# Patient Record
Sex: MALE | Race: WHITE | Employment: OTHER | URBAN - METROPOLITAN AREA
[De-identification: names, ages, dates, MRNs, and addresses within clinical notes are randomized per-mention and may not be internally consistent; named-entity substitution may affect disease eponyms.]

---

## 2019-06-21 ENCOUNTER — ANESTHESIA EVENT (OUTPATIENT)
Dept: SURGERY | Age: 69
End: 2019-06-21
Payer: COMMERCIAL

## 2019-06-24 NOTE — PERIOP NOTES
PAT - SURGICAL PRE-ADMISSION INSTRUCTIONS    NAME:  Anselmo Ken                                                          TODAY'S DATE:  6/24/2019    SURGERY DATE:  6/26/2019                                  SURGERY ARRIVAL TIME:   0530    1. Do NOT eat or drink anything, including candy or gum, after MIDNIGHT on 06/25 , unless you have specific instructions from your Surgeon or Anesthesia Provider to do so. 2. No smoking on the day of surgery. 3. No alcohol 24 hours prior to the day of surgery. 4. No recreational drugs for one week prior to the day of surgery. 5. Leave all valuables, including money/purse, at home. 6. Remove all jewelry, nail polish, makeup (including mascara); no lotions, powders, deodorant, or perfume/cologne/after shave. 7. Glasses/Contact lenses and Dentures may be worn to the hospital.  They will be removed prior to surgery. 8. Call your doctor if symptoms of a cold or illness develop within 24 ours prior to surgery. 9. AN ADULT MUST DRIVE YOU HOME AFTER OUTPATIENT SURGERY. 10. If you are having an OUTPATIENT procedure, please make arrangements for a responsible adult to be with you for 24 hours after your surgery. 11. If you are admitted to the hospital, you will be assigned to a bed after surgery is complete. Normally a family member will not be able to see you until you are in your assigned bed. 15. Family is encouraged to accompany you to the hospital.  We ask visitors in the treatment area to be limited to ONE person at a time to ensure patient privacy. EXCEPTIONS WILL BE MADE AS NEEDED. 15. Children under 12 are discouraged from entering the treatment area and need to be supervised by an adult when in the waiting room. Special Instructions: Take these medications the morning of surgery with a sip of water:  Protonix ,Use Nasal Spray, Complete bowel prep per MD instructions.     Patient Prep:    shower with anti-bacterial soap    These surgical instructions were reviewed with Ousmane Wolff during the PAT phone call. Directions: On the morning of surgery, please go to the [address] [facility]. Enter the building from the [facility] entrance, 1st floor (next to the Emergency Room entrance). Take the elevator to the 2nd floor. Sign in at the Registration Desk.     If you have any questions and/or concerns, please do not hesitate to call:  (Prior to the day of surgery)  [facility] unit:  [phone]  (Day of surgery)  [facility] unit:  [phone]

## 2019-06-26 ENCOUNTER — ANESTHESIA (OUTPATIENT)
Dept: SURGERY | Age: 69
End: 2019-06-26
Payer: COMMERCIAL

## 2019-06-26 ENCOUNTER — HOSPITAL ENCOUNTER (OUTPATIENT)
Age: 69
Setting detail: OBSERVATION
LOS: 1 days | Discharge: HOME | End: 2019-06-27
Attending: UROLOGY | Admitting: UROLOGY
Payer: COMMERCIAL

## 2019-06-26 DIAGNOSIS — C61 PROSTATE CANCER (HCC): Primary | ICD-10-CM

## 2019-06-26 LAB
ABO + RH BLD: NORMAL
BLOOD GROUP ANTIBODIES SERPL: NORMAL
SPECIMEN EXP DATE BLD: NORMAL

## 2019-06-26 PROCEDURE — 77030035048 HC TRCR ENDOSC OPTCL COVD -B: Performed by: UROLOGY

## 2019-06-26 PROCEDURE — 77030010545: Performed by: UROLOGY

## 2019-06-26 PROCEDURE — 77030035277 HC OBTRTR BLDELSS DISP INTU -B: Performed by: UROLOGY

## 2019-06-26 PROCEDURE — 99218 HC RM OBSERVATION: CPT

## 2019-06-26 PROCEDURE — 88304 TISSUE EXAM BY PATHOLOGIST: CPT

## 2019-06-26 PROCEDURE — 77030002933 HC SUT MCRYL J&J -A: Performed by: UROLOGY

## 2019-06-26 PROCEDURE — 74011250637 HC RX REV CODE- 250/637: Performed by: UROLOGY

## 2019-06-26 PROCEDURE — 77030032490 HC SLV COMPR SCD KNE COVD -B: Performed by: UROLOGY

## 2019-06-26 PROCEDURE — 77030008477 HC STYL SATN SLP COVD -A: Performed by: ANESTHESIOLOGY

## 2019-06-26 PROCEDURE — 77030008683 HC TU ET CUF COVD -A: Performed by: ANESTHESIOLOGY

## 2019-06-26 PROCEDURE — 77030039266 HC ADH SKN EXOFIN S2SG -A: Performed by: UROLOGY

## 2019-06-26 PROCEDURE — 77030010939 HC CLP LIG TELE -B: Performed by: UROLOGY

## 2019-06-26 PROCEDURE — 74011250636 HC RX REV CODE- 250/636: Performed by: NURSE ANESTHETIST, CERTIFIED REGISTERED

## 2019-06-26 PROCEDURE — 77030013079 HC BLNKT BAIR HGGR 3M -A: Performed by: ANESTHESIOLOGY

## 2019-06-26 PROCEDURE — 77030020263 HC SOL INJ SOD CL0.9% LFCR 1000ML: Performed by: UROLOGY

## 2019-06-26 PROCEDURE — 74011000250 HC RX REV CODE- 250: Performed by: UROLOGY

## 2019-06-26 PROCEDURE — 76060000037 HC ANESTHESIA 3 TO 3.5 HR: Performed by: UROLOGY

## 2019-06-26 PROCEDURE — 77030034696 HC CATH URETH FOL 2W BARD -A: Performed by: UROLOGY

## 2019-06-26 PROCEDURE — 77030018846 HC SOL IRR STRL H20 ICUM -A: Performed by: UROLOGY

## 2019-06-26 PROCEDURE — 88309 TISSUE EXAM BY PATHOLOGIST: CPT

## 2019-06-26 PROCEDURE — 77030009848 HC PASSR SUT SET COOP -C: Performed by: UROLOGY

## 2019-06-26 PROCEDURE — 74011250636 HC RX REV CODE- 250/636

## 2019-06-26 PROCEDURE — 77030022704 HC SUT VLOC COVD -B: Performed by: UROLOGY

## 2019-06-26 PROCEDURE — 74011000272 HC RX REV CODE- 272: Performed by: UROLOGY

## 2019-06-26 PROCEDURE — 74011250636 HC RX REV CODE- 250/636: Performed by: UROLOGY

## 2019-06-26 PROCEDURE — 77030008462 HC STPLR SKN PROX J&J -A: Performed by: UROLOGY

## 2019-06-26 PROCEDURE — 77030002966 HC SUT PDS J&J -A: Performed by: UROLOGY

## 2019-06-26 PROCEDURE — 76010000878 HC OR TIME 3 TO 3.5HR INTENSV - TIER 2: Performed by: UROLOGY

## 2019-06-26 PROCEDURE — 77030018813 HC SCIS LAPSCP EPIX DISP AMR -B: Performed by: UROLOGY

## 2019-06-26 PROCEDURE — 77030013449 HC CLP LIG TELE -A: Performed by: UROLOGY

## 2019-06-26 PROCEDURE — 77030016151 HC PROTCTR LNS DFOG COVD -B: Performed by: UROLOGY

## 2019-06-26 PROCEDURE — 86900 BLOOD TYPING SEROLOGIC ABO: CPT

## 2019-06-26 PROCEDURE — C1729 CATH, DRAINAGE: HCPCS | Performed by: UROLOGY

## 2019-06-26 PROCEDURE — 77030009852 HC PCH RTVR ENDOSC COVD -B: Performed by: UROLOGY

## 2019-06-26 PROCEDURE — 74011000250 HC RX REV CODE- 250

## 2019-06-26 PROCEDURE — 77030010513 HC APPL CLP LIG J&J -C: Performed by: UROLOGY

## 2019-06-26 PROCEDURE — 74011250637 HC RX REV CODE- 250/637: Performed by: NURSE ANESTHETIST, CERTIFIED REGISTERED

## 2019-06-26 PROCEDURE — 76210000017 HC OR PH I REC 1.5 TO 2 HR: Performed by: UROLOGY

## 2019-06-26 PROCEDURE — 77030031139 HC SUT VCRL2 J&J -A: Performed by: UROLOGY

## 2019-06-26 PROCEDURE — 77030010935 HC CLP LIG ABSRB TELE -B: Performed by: UROLOGY

## 2019-06-26 PROCEDURE — 77030035045 HC TRCR ENDOSC VRSPRT BLDLSS COVD -B: Performed by: UROLOGY

## 2019-06-26 RX ORDER — FLUTICASONE PROPIONATE 50 MCG
2 SPRAY, SUSPENSION (ML) NASAL DAILY
Status: DISCONTINUED | OUTPATIENT
Start: 2019-06-27 | End: 2019-06-27 | Stop reason: HOSPADM

## 2019-06-26 RX ORDER — SODIUM CHLORIDE, SODIUM LACTATE, POTASSIUM CHLORIDE, CALCIUM CHLORIDE 600; 310; 30; 20 MG/100ML; MG/100ML; MG/100ML; MG/100ML
50 INJECTION, SOLUTION INTRAVENOUS CONTINUOUS
Status: DISCONTINUED | OUTPATIENT
Start: 2019-06-26 | End: 2019-06-26 | Stop reason: HOSPADM

## 2019-06-26 RX ORDER — LIDOCAINE HYDROCHLORIDE 20 MG/ML
INJECTION, SOLUTION EPIDURAL; INFILTRATION; INTRACAUDAL; PERINEURAL AS NEEDED
Status: DISCONTINUED | OUTPATIENT
Start: 2019-06-26 | End: 2019-06-26 | Stop reason: HOSPADM

## 2019-06-26 RX ORDER — SODIUM CHLORIDE 0.9 % (FLUSH) 0.9 %
5-40 SYRINGE (ML) INJECTION AS NEEDED
Status: DISCONTINUED | OUTPATIENT
Start: 2019-06-26 | End: 2019-06-26 | Stop reason: HOSPADM

## 2019-06-26 RX ORDER — VECURONIUM BROMIDE FOR INJECTION 1 MG/ML
INJECTION, POWDER, LYOPHILIZED, FOR SOLUTION INTRAVENOUS AS NEEDED
Status: DISCONTINUED | OUTPATIENT
Start: 2019-06-26 | End: 2019-06-26 | Stop reason: HOSPADM

## 2019-06-26 RX ORDER — PANTOPRAZOLE SODIUM 40 MG/1
40 TABLET, DELAYED RELEASE ORAL 2 TIMES DAILY
Status: DISCONTINUED | OUTPATIENT
Start: 2019-06-26 | End: 2019-06-27 | Stop reason: HOSPADM

## 2019-06-26 RX ORDER — METHYLENE BLUE 10 MG/ML
INJECTION INTRAVENOUS AS NEEDED
Status: DISCONTINUED | OUTPATIENT
Start: 2019-06-26 | End: 2019-06-26 | Stop reason: HOSPADM

## 2019-06-26 RX ORDER — DEXAMETHASONE SODIUM PHOSPHATE 4 MG/ML
INJECTION, SOLUTION INTRA-ARTICULAR; INTRALESIONAL; INTRAMUSCULAR; INTRAVENOUS; SOFT TISSUE AS NEEDED
Status: DISCONTINUED | OUTPATIENT
Start: 2019-06-26 | End: 2019-06-26 | Stop reason: HOSPADM

## 2019-06-26 RX ORDER — SUCCINYLCHOLINE CHLORIDE 20 MG/ML
INJECTION INTRAMUSCULAR; INTRAVENOUS AS NEEDED
Status: DISCONTINUED | OUTPATIENT
Start: 2019-06-26 | End: 2019-06-26 | Stop reason: HOSPADM

## 2019-06-26 RX ORDER — ONDANSETRON 2 MG/ML
INJECTION INTRAMUSCULAR; INTRAVENOUS AS NEEDED
Status: DISCONTINUED | OUTPATIENT
Start: 2019-06-26 | End: 2019-06-26 | Stop reason: HOSPADM

## 2019-06-26 RX ORDER — EPHEDRINE SULFATE 50 MG/ML
INJECTION, SOLUTION INTRAVENOUS AS NEEDED
Status: DISCONTINUED | OUTPATIENT
Start: 2019-06-26 | End: 2019-06-26 | Stop reason: HOSPADM

## 2019-06-26 RX ORDER — HYDROMORPHONE HYDROCHLORIDE 2 MG/ML
0.5 INJECTION, SOLUTION INTRAMUSCULAR; INTRAVENOUS; SUBCUTANEOUS
Status: DISCONTINUED | OUTPATIENT
Start: 2019-06-26 | End: 2019-06-26 | Stop reason: HOSPADM

## 2019-06-26 RX ORDER — PROPOFOL 10 MG/ML
INJECTION, EMULSION INTRAVENOUS AS NEEDED
Status: DISCONTINUED | OUTPATIENT
Start: 2019-06-26 | End: 2019-06-26 | Stop reason: HOSPADM

## 2019-06-26 RX ORDER — BENZOCAINE .13; .15; .5; 2 G/100G; G/100G; G/100G; G/100G
1 GEL ORAL DAILY
Status: DISCONTINUED | OUTPATIENT
Start: 2019-06-27 | End: 2019-06-26 | Stop reason: CLARIF

## 2019-06-26 RX ORDER — GLYCOPYRROLATE 0.2 MG/ML
INJECTION INTRAMUSCULAR; INTRAVENOUS AS NEEDED
Status: DISCONTINUED | OUTPATIENT
Start: 2019-06-26 | End: 2019-06-26

## 2019-06-26 RX ORDER — NALOXONE HYDROCHLORIDE 0.4 MG/ML
0.4 INJECTION, SOLUTION INTRAMUSCULAR; INTRAVENOUS; SUBCUTANEOUS AS NEEDED
Status: DISCONTINUED | OUTPATIENT
Start: 2019-06-26 | End: 2019-06-27 | Stop reason: HOSPADM

## 2019-06-26 RX ORDER — BUPIVACAINE HYDROCHLORIDE AND EPINEPHRINE 2.5; 5 MG/ML; UG/ML
INJECTION, SOLUTION EPIDURAL; INFILTRATION; INTRACAUDAL; PERINEURAL AS NEEDED
Status: DISCONTINUED | OUTPATIENT
Start: 2019-06-26 | End: 2019-06-26 | Stop reason: HOSPADM

## 2019-06-26 RX ORDER — HYDROMORPHONE HYDROCHLORIDE 1 MG/ML
1 INJECTION, SOLUTION INTRAMUSCULAR; INTRAVENOUS; SUBCUTANEOUS
Status: DISCONTINUED | OUTPATIENT
Start: 2019-06-26 | End: 2019-06-27 | Stop reason: HOSPADM

## 2019-06-26 RX ORDER — SODIUM CHLORIDE 0.9 % (FLUSH) 0.9 %
5-40 SYRINGE (ML) INJECTION AS NEEDED
Status: DISCONTINUED | OUTPATIENT
Start: 2019-06-26 | End: 2019-06-27 | Stop reason: HOSPADM

## 2019-06-26 RX ORDER — OXYBUTYNIN CHLORIDE 5 MG/1
5 TABLET ORAL 3 TIMES DAILY
Qty: 30 TAB | Refills: 0 | Status: SHIPPED | OUTPATIENT
Start: 2019-06-26 | End: 2019-07-29

## 2019-06-26 RX ORDER — HEPARIN SODIUM 5000 [USP'U]/ML
5000 INJECTION, SOLUTION INTRAVENOUS; SUBCUTANEOUS
Status: COMPLETED | OUTPATIENT
Start: 2019-06-26 | End: 2019-06-26

## 2019-06-26 RX ORDER — LIDOCAINE HYDROCHLORIDE 10 MG/ML
0.1 INJECTION, SOLUTION EPIDURAL; INFILTRATION; INTRACAUDAL; PERINEURAL AS NEEDED
Status: DISCONTINUED | OUTPATIENT
Start: 2019-06-26 | End: 2019-06-26 | Stop reason: HOSPADM

## 2019-06-26 RX ORDER — DOCUSATE SODIUM 100 MG/1
100 CAPSULE, LIQUID FILLED ORAL 2 TIMES DAILY
Qty: 60 CAP | Refills: 0 | Status: SHIPPED | OUTPATIENT
Start: 2019-06-26 | End: 2019-07-26

## 2019-06-26 RX ORDER — FAMOTIDINE 20 MG/1
20 TABLET, FILM COATED ORAL ONCE
Status: COMPLETED | OUTPATIENT
Start: 2019-06-26 | End: 2019-06-26

## 2019-06-26 RX ORDER — SODIUM CHLORIDE 0.9 % (FLUSH) 0.9 %
5-40 SYRINGE (ML) INJECTION EVERY 8 HOURS
Status: DISCONTINUED | OUTPATIENT
Start: 2019-06-26 | End: 2019-06-27 | Stop reason: HOSPADM

## 2019-06-26 RX ORDER — SODIUM CHLORIDE, SODIUM LACTATE, POTASSIUM CHLORIDE, CALCIUM CHLORIDE 600; 310; 30; 20 MG/100ML; MG/100ML; MG/100ML; MG/100ML
INJECTION, SOLUTION INTRAVENOUS
Status: DISCONTINUED | OUTPATIENT
Start: 2019-06-26 | End: 2019-06-26 | Stop reason: HOSPADM

## 2019-06-26 RX ORDER — FENTANYL CITRATE 50 UG/ML
INJECTION, SOLUTION INTRAMUSCULAR; INTRAVENOUS AS NEEDED
Status: DISCONTINUED | OUTPATIENT
Start: 2019-06-26 | End: 2019-06-26 | Stop reason: HOSPADM

## 2019-06-26 RX ORDER — SODIUM CHLORIDE, SODIUM LACTATE, POTASSIUM CHLORIDE, CALCIUM CHLORIDE 600; 310; 30; 20 MG/100ML; MG/100ML; MG/100ML; MG/100ML
125 INJECTION, SOLUTION INTRAVENOUS CONTINUOUS
Status: DISCONTINUED | OUTPATIENT
Start: 2019-06-26 | End: 2019-06-27 | Stop reason: HOSPADM

## 2019-06-26 RX ORDER — DIPHENHYDRAMINE HYDROCHLORIDE 50 MG/ML
12.5 INJECTION, SOLUTION INTRAMUSCULAR; INTRAVENOUS
Status: DISCONTINUED | OUTPATIENT
Start: 2019-06-26 | End: 2019-06-27 | Stop reason: HOSPADM

## 2019-06-26 RX ORDER — CEFAZOLIN SODIUM 2 G/50ML
2 SOLUTION INTRAVENOUS
Status: COMPLETED | OUTPATIENT
Start: 2019-06-26 | End: 2019-06-26

## 2019-06-26 RX ORDER — OXYBUTYNIN CHLORIDE 5 MG/1
5 TABLET ORAL
Status: DISCONTINUED | OUTPATIENT
Start: 2019-06-26 | End: 2019-06-27 | Stop reason: HOSPADM

## 2019-06-26 RX ORDER — HEPARIN SODIUM 5000 [USP'U]/ML
5000 INJECTION, SOLUTION INTRAVENOUS; SUBCUTANEOUS EVERY 8 HOURS
Status: DISCONTINUED | OUTPATIENT
Start: 2019-06-27 | End: 2019-06-27 | Stop reason: HOSPADM

## 2019-06-26 RX ORDER — ONDANSETRON 2 MG/ML
4 INJECTION INTRAMUSCULAR; INTRAVENOUS
Status: DISCONTINUED | OUTPATIENT
Start: 2019-06-26 | End: 2019-06-27 | Stop reason: HOSPADM

## 2019-06-26 RX ORDER — HYDROMORPHONE HYDROCHLORIDE 1 MG/ML
INJECTION, SOLUTION INTRAMUSCULAR; INTRAVENOUS; SUBCUTANEOUS AS NEEDED
Status: DISCONTINUED | OUTPATIENT
Start: 2019-06-26 | End: 2019-06-26 | Stop reason: HOSPADM

## 2019-06-26 RX ORDER — SODIUM CHLORIDE, SODIUM LACTATE, POTASSIUM CHLORIDE, CALCIUM CHLORIDE 600; 310; 30; 20 MG/100ML; MG/100ML; MG/100ML; MG/100ML
25 INJECTION, SOLUTION INTRAVENOUS CONTINUOUS
Status: DISCONTINUED | OUTPATIENT
Start: 2019-06-26 | End: 2019-06-26 | Stop reason: HOSPADM

## 2019-06-26 RX ORDER — OXYCODONE AND ACETAMINOPHEN 5; 325 MG/1; MG/1
1 TABLET ORAL
Qty: 15 TAB | Refills: 0 | Status: SHIPPED | OUTPATIENT
Start: 2019-06-26 | End: 2019-07-03

## 2019-06-26 RX ORDER — ACETAMINOPHEN 325 MG/1
650 TABLET ORAL
Status: DISCONTINUED | OUTPATIENT
Start: 2019-06-26 | End: 2019-06-27 | Stop reason: HOSPADM

## 2019-06-26 RX ORDER — SODIUM CHLORIDE 0.9 % (FLUSH) 0.9 %
5-40 SYRINGE (ML) INJECTION EVERY 8 HOURS
Status: DISCONTINUED | OUTPATIENT
Start: 2019-06-26 | End: 2019-06-26 | Stop reason: HOSPADM

## 2019-06-26 RX ORDER — ONDANSETRON 2 MG/ML
4 INJECTION INTRAMUSCULAR; INTRAVENOUS ONCE
Status: COMPLETED | OUTPATIENT
Start: 2019-06-26 | End: 2019-06-26

## 2019-06-26 RX ORDER — MIDAZOLAM HYDROCHLORIDE 1 MG/ML
INJECTION, SOLUTION INTRAMUSCULAR; INTRAVENOUS AS NEEDED
Status: DISCONTINUED | OUTPATIENT
Start: 2019-06-26 | End: 2019-06-26 | Stop reason: HOSPADM

## 2019-06-26 RX ORDER — OXYCODONE AND ACETAMINOPHEN 5; 325 MG/1; MG/1
1 TABLET ORAL
Status: DISCONTINUED | OUTPATIENT
Start: 2019-06-26 | End: 2019-06-27 | Stop reason: HOSPADM

## 2019-06-26 RX ORDER — ATROPA BELLADONNA AND OPIUM 16.2; 3 MG/1; MG/1
SUPPOSITORY RECTAL AS NEEDED
Status: DISCONTINUED | OUTPATIENT
Start: 2019-06-26 | End: 2019-06-26 | Stop reason: HOSPADM

## 2019-06-26 RX ORDER — GLYCOPYRROLATE 0.2 MG/ML
INJECTION INTRAMUSCULAR; INTRAVENOUS AS NEEDED
Status: DISCONTINUED | OUTPATIENT
Start: 2019-06-26 | End: 2019-06-26 | Stop reason: HOSPADM

## 2019-06-26 RX ORDER — CEFAZOLIN SODIUM 2 G/50ML
2 SOLUTION INTRAVENOUS EVERY 8 HOURS
Status: COMPLETED | OUTPATIENT
Start: 2019-06-26 | End: 2019-06-27

## 2019-06-26 RX ORDER — FENTANYL CITRATE 50 UG/ML
50 INJECTION, SOLUTION INTRAMUSCULAR; INTRAVENOUS AS NEEDED
Status: DISCONTINUED | OUTPATIENT
Start: 2019-06-26 | End: 2019-06-26 | Stop reason: HOSPADM

## 2019-06-26 RX ADMIN — SODIUM CHLORIDE, SODIUM LACTATE, POTASSIUM CHLORIDE, AND CALCIUM CHLORIDE 25 ML/HR: 600; 310; 30; 20 INJECTION, SOLUTION INTRAVENOUS at 07:06

## 2019-06-26 RX ADMIN — LIDOCAINE HYDROCHLORIDE 60 MG: 20 INJECTION, SOLUTION EPIDURAL; INFILTRATION; INTRACAUDAL; PERINEURAL at 07:52

## 2019-06-26 RX ADMIN — MIDAZOLAM HYDROCHLORIDE 2 MG: 1 INJECTION, SOLUTION INTRAMUSCULAR; INTRAVENOUS at 07:43

## 2019-06-26 RX ADMIN — HYDROMORPHONE HYDROCHLORIDE 1 MG: 1 INJECTION, SOLUTION INTRAMUSCULAR; INTRAVENOUS; SUBCUTANEOUS at 15:11

## 2019-06-26 RX ADMIN — PROPOFOL 200 MG: 10 INJECTION, EMULSION INTRAVENOUS at 07:52

## 2019-06-26 RX ADMIN — FENTANYL CITRATE 50 MCG: 50 INJECTION, SOLUTION INTRAMUSCULAR; INTRAVENOUS at 12:16

## 2019-06-26 RX ADMIN — OXYCODONE HYDROCHLORIDE AND ACETAMINOPHEN 1 TABLET: 5; 325 TABLET ORAL at 12:41

## 2019-06-26 RX ADMIN — FENTANYL CITRATE 50 MCG: 50 INJECTION, SOLUTION INTRAMUSCULAR; INTRAVENOUS at 12:01

## 2019-06-26 RX ADMIN — HYDROMORPHONE HYDROCHLORIDE 1 MG: 1 INJECTION, SOLUTION INTRAMUSCULAR; INTRAVENOUS; SUBCUTANEOUS at 08:15

## 2019-06-26 RX ADMIN — SODIUM CHLORIDE, SODIUM LACTATE, POTASSIUM CHLORIDE, CALCIUM CHLORIDE: 600; 310; 30; 20 INJECTION, SOLUTION INTRAVENOUS at 08:04

## 2019-06-26 RX ADMIN — GLYCOPYRROLATE 0.2 MG: 0.2 INJECTION INTRAMUSCULAR; INTRAVENOUS at 08:22

## 2019-06-26 RX ADMIN — ONDANSETRON 4 MG: 2 INJECTION INTRAMUSCULAR; INTRAVENOUS at 12:33

## 2019-06-26 RX ADMIN — FENTANYL CITRATE 100 MCG: 50 INJECTION, SOLUTION INTRAMUSCULAR; INTRAVENOUS at 07:52

## 2019-06-26 RX ADMIN — OXYBUTYNIN CHLORIDE 5 MG: 5 TABLET ORAL at 12:21

## 2019-06-26 RX ADMIN — VECURONIUM BROMIDE FOR INJECTION 8 MG: 1 INJECTION, POWDER, LYOPHILIZED, FOR SOLUTION INTRAVENOUS at 07:58

## 2019-06-26 RX ADMIN — CEFAZOLIN SODIUM 2 G: 2 SOLUTION INTRAVENOUS at 07:50

## 2019-06-26 RX ADMIN — SUCCINYLCHOLINE CHLORIDE 100 MG: 20 INJECTION INTRAMUSCULAR; INTRAVENOUS at 07:53

## 2019-06-26 RX ADMIN — PANTOPRAZOLE SODIUM 40 MG: 40 TABLET, DELAYED RELEASE ORAL at 18:25

## 2019-06-26 RX ADMIN — DEXAMETHASONE SODIUM PHOSPHATE 4 MG: 4 INJECTION, SOLUTION INTRA-ARTICULAR; INTRALESIONAL; INTRAMUSCULAR; INTRAVENOUS; SOFT TISSUE at 08:40

## 2019-06-26 RX ADMIN — HYDROMORPHONE HYDROCHLORIDE 1 MG: 1 INJECTION, SOLUTION INTRAMUSCULAR; INTRAVENOUS; SUBCUTANEOUS at 23:45

## 2019-06-26 RX ADMIN — FAMOTIDINE 20 MG: 20 TABLET ORAL at 06:45

## 2019-06-26 RX ADMIN — METHYLENE BLUE 3 ML: 10 INJECTION INTRAVENOUS at 10:00

## 2019-06-26 RX ADMIN — CEFAZOLIN 2 G: 10 INJECTION, POWDER, FOR SOLUTION INTRAVENOUS at 23:34

## 2019-06-26 RX ADMIN — Medication 10 ML: at 21:18

## 2019-06-26 RX ADMIN — EPHEDRINE SULFATE 10 MG: 50 INJECTION, SOLUTION INTRAVENOUS at 09:00

## 2019-06-26 RX ADMIN — Medication 10 ML: at 15:05

## 2019-06-26 RX ADMIN — SODIUM CHLORIDE, SODIUM LACTATE, POTASSIUM CHLORIDE, AND CALCIUM CHLORIDE: 600; 310; 30; 20 INJECTION, SOLUTION INTRAVENOUS at 09:15

## 2019-06-26 RX ADMIN — VECURONIUM BROMIDE FOR INJECTION 2 MG: 1 INJECTION, POWDER, LYOPHILIZED, FOR SOLUTION INTRAVENOUS at 09:59

## 2019-06-26 RX ADMIN — GLYCOPYRROLATE 0.2 MG: 0.2 INJECTION INTRAMUSCULAR; INTRAVENOUS at 08:24

## 2019-06-26 RX ADMIN — CEFAZOLIN 2 G: 10 INJECTION, POWDER, FOR SOLUTION INTRAVENOUS at 15:04

## 2019-06-26 RX ADMIN — HEPARIN SODIUM 5000 UNITS: 5000 INJECTION, SOLUTION INTRAVENOUS; SUBCUTANEOUS at 06:45

## 2019-06-26 RX ADMIN — EPHEDRINE SULFATE 10 MG: 50 INJECTION, SOLUTION INTRAVENOUS at 10:15

## 2019-06-26 RX ADMIN — VECURONIUM BROMIDE FOR INJECTION 2 MG: 1 INJECTION, POWDER, LYOPHILIZED, FOR SOLUTION INTRAVENOUS at 08:52

## 2019-06-26 RX ADMIN — SODIUM CHLORIDE, SODIUM LACTATE, POTASSIUM CHLORIDE, AND CALCIUM CHLORIDE 125 ML/HR: 600; 310; 30; 20 INJECTION, SOLUTION INTRAVENOUS at 15:05

## 2019-06-26 RX ADMIN — ONDANSETRON 4 MG: 2 INJECTION INTRAMUSCULAR; INTRAVENOUS at 10:43

## 2019-06-26 NOTE — ANESTHESIA POSTPROCEDURE EVALUATION
Procedure(s):  DaVinci laparoscopic radical prostatectomy (bilateral nerve Sparing)    .    general    Anesthesia Post Evaluation      Multimodal analgesia: multimodal analgesia used between 6 hours prior to anesthesia start to PACU discharge  Patient location during evaluation: bedside  Patient participation: complete - patient participated  Level of consciousness: awake  Pain management: adequate  Airway patency: patent  Anesthetic complications: no  Cardiovascular status: acceptable  Respiratory status: acceptable  Hydration status: acceptable  Post anesthesia nausea and vomiting:  controlled      Vitals Value Taken Time   /70 6/26/2019 12:05 PM   Temp 36.3 °C (97.3 °F) 6/26/2019 11:10 AM   Pulse 86 6/26/2019 12:10 PM   Resp 13 6/26/2019 12:10 PM   SpO2 94 % 6/26/2019 12:10 PM   Vitals shown include unvalidated device data.

## 2019-06-26 NOTE — ANESTHESIA PREPROCEDURE EVALUATION
Relevant Problems   No relevant active problems       Anesthetic History   No history of anesthetic complications            Review of Systems / Medical History  Patient summary reviewed and pertinent labs reviewed    Pulmonary            Asthma     Comments: Remote h/o spontaneous pneumothorax   Neuro/Psych   Within defined limits           Cardiovascular                  Exercise tolerance: >4 METS     GI/Hepatic/Renal           PUD     Endo/Other  Within defined limits           Other Findings            Physical Exam    Airway  Mallampati: I  TM Distance: 4 - 6 cm  Neck ROM: normal range of motion        Cardiovascular    Rhythm: regular  Rate: normal         Dental    Dentition: Poor dentition     Pulmonary  Breath sounds clear to auscultation               Abdominal  GI exam deferred       Other Findings            Anesthetic Plan    ASA: 2  Anesthesia type: general          Induction: Intravenous  Anesthetic plan and risks discussed with: Patient

## 2019-06-26 NOTE — PERIOP NOTES
1110 Pt received to PACU and connected to monitor. Bedside report given by Ramona Bermudez RN. Vital signs stable. Nurse at bedside. Will continue to monitor. 18 Called to update pt's family on current status and room assignment. 1255 TRANSFER - OUT REPORT:    Verbal report given to Ruben Chaves on Lacy Mcclellan  being transferred to Room 2203 (unit) for routine post - op       Report consisted of patients Situation, Background, Assessment and   Recommendations(SBAR). Information from the following report(s) SBAR, Kardex and MAR was reviewed with the receiving nurse. Lines:   Peripheral IV Left;Posterior Hand (Active)   Site Assessment Clean, dry, & intact 6/26/2019  7:05 AM   Phlebitis Assessment 0 6/26/2019  7:05 AM   Infiltration Assessment 0 6/26/2019  7:05 AM   Dressing Status Clean, dry, & intact 6/26/2019  7:05 AM   Dressing Type Transparent;Tape 6/26/2019  7:05 AM   Hub Color/Line Status Pink; Infusing 6/26/2019  7:05 AM       Peripheral IV 06/26/19 Right Forearm (Active)        Opportunity for questions and clarification was provided.       Patient transported with:   Registered Nurse  Tech

## 2019-06-26 NOTE — OP NOTES
DATE OF OPERATION:  6/26/19    PREOPERATIVE DIAGNOSIS:  Prostate cancer. POSTOPERATIVE DIAGNOSIS:  Prostate cancer. OPERATION PERFORMED:  1. DaVinci laparoscopic radical prostatectomy (bilateral nerve Sparing)      SURGEON:  Jeet Hoyos MD    ASSISTANT SURGEONS:  Jim HERRMANN    ANESTHESIA:  General.    SPECIMEN:  Prostate with bilateral seminal vesicals,  anterior fat pad. FINDINGS:  Accessory Pudendal Vessel: none  Normal appearing prostate and lymph nodes    INDICATION:  Pt.is a 82 yo male who presented with elevated PSA. Prostate biopsies showed Shirley 3 + 4 cancer involving the left side of his prostate. Treatment options were discussed with him  at length and he chose DaVinci radical prostatectomy. He has  reasonable erections and the plan is for bilateral nerve sparing. Bilateral pelvic lymph node dissection was not planned due to his risk stratification. PROCEDURE IN DETAIL:  Patient was given IV antibiotics preoperatively. He had sequential  compression devices applied preoperatively and sub Q heparin given for DVT prophylaxis. He was taken to the operating room where he was induced with  general anesthesia. After adequate anesthesia, he was placed in  the dorsal lithotomy position. His arms were draped by his side  and was appropriately padded and secured to the operating room  table. He was placed in the Trendelenburg position. Rectal exam showed prostate to be 20 cc without nodules. He was prepped and draped in sterile fashion. An 25 Kittitian Osborn  was placed in the bladder and instilled 20 cc sterile water. Orogastric tube was placed. The Veress needle was passed just  above the umbilicus and the abdomen was insufflated to 15  atmospheres. A 12 mm, blunt-tip trocar was placed just above  the umbilicus.   The zero-degree camera was passed within  this and the following trocars were placed under direct vision; 8  mm robotic trocars were placed 9 cm laterally and inferiorly to  the initially placed umbilical trocar. A third one was placed 7  cm lateral to the left-sided trocar. In the corresponding  position on the right side, a 12 mm trocar was placed, and then a  5 mm trocar was placed to the right and well above the umbilicus. The robot was then docked with the robot trocar. I used the  zero-degree camera. I had the hot scissors in the right hand  and the left hand with the Gyrus forceps and far left hand the  Cardier forceps. Initially I divided the median umbilical  ligament from the urachus and developed the space of Retzius down  to pubic bone. I divided the parietal peritoneum laterally up  to the vas deferens on each side. I used the Cardier forceps to  provide cranial traction on the urachus. I cleaned off the periprostatic fat over lying the prostate and sent this to pathology. I then divided the endopelvic fascia on each side  laterally to the perirectal fat and medially to the  puboprostatic ligaments. I then ligated the  dorsal vein complex with a 0 V-Loc suture in a figure of eight fashion securing it to the post aspect of the pubic bone. I then addressed the bladder neck with a 30-degree down lens. I identified the bladder neck by pulling on the Osborn catheter. I divided the anterior bladder neck musculature until I then  found the anterior bladder neck mucosa which was incised. I  identified the Osborn catheter within, deflated the balloon,  pulled the Osborn out through this opening and then using the  Octavio-Duc needle with a #0-Vicryl suture, passed to the  suprapubic region and pulled the suture through the eye of the  Osborn and then back out. This allowed me to provide upward  traction on the prostate. I then divided the lateral bladder  neck mucosa and the posterior bladder neck mucosa. I was well  away from ureteral orifices. I divided the posterior bladder  neck musculature until we identified the vas deferens.   They were  freed proximally, then divided. I freed up the seminal vesicals  using blunt and sharp dissection. I placed clips on the vessels to the  seminal  vesicals and avoided cautery. I then went back to the 0-degree lens. I divided the  Denonvilliers fascia beneath the prostate and developed the  prostate off the rectum. I then did bilateral nerve sparing by  dividing the lateral pelvic fascia dissecting off the prostate  capsule laterally. I then isolated the pedicles of the prostate  and placed weck clips on the pedicles of the prostate and then  divided it with cold scissors. I continued to divide the  neurovascular bundles off the prostate out to the apex of the  prostate. At this point the prostate was freed up except for the  urethra. I addressed the prostate anteriorly, divided the dorsal vein , then the  anterior urethral wall, pulled the Osborn catheter back and then divided the   posterior urethral wall preserving as much urethral length as safely possible. Specimen was completely freed up. I  placed the prostate in an Endo catch bag and then placed the bag in the upper abdomen out of the way. I  then irrigated the pelvis. The rectal test was negative. With good hemostasis, I then did the posterior reconstruction. I took a 3-0 VLoc suture on an RB1 needle . I passed the  suture through the cut edges of Denonvilliers  fascia beneath the bladder on the right side and through the  posterior striated sphincter underneath the urethra. Then I ran  this from right to left. I then took the other end of the suture passing just proximal to the posterior  bladder neck in the midline and to the posterior striated sphincter  and ran this from right to left using 3 throws and reapproximated  these structures and then tied this suture to the other end of the  suture. I then did the anastomosis again with 3-0 VLoc suture on RB1  needle.    I passed both ends of the suture from the  outside-in through the bladder neck  at the  6 o'clock position. I passed both through the urethral  stump from the inside-out in the corresponding position. I  reapproximated the bladder neck to the urethra. I then ran the  Left suture on the left side anastomosis to the 9 o'clock  position. Then I went back to the right sided suture and ran that up  the right side to the 12 o'clock position. I then continued the  left suture to the 12 o'clock position. Patient was given  indigo carmine prior to this and there was good efflux in both  ureteral orifices. I then placed a new 21 Telugu Osborn into the bladder and filled  it with 12 cc sterile water. I then secured the knot and then passed suture behind pubic bone for anterior suspension. I  irrigated the bladder with 200 cc. There was no leakage. There was reasonable hemostasis. The instruments were then removed. The robot was undocked and  all the trocars were removed under direct vision. There was good  hemostasis. I then enlarged the umbilical trocar site large  enough to remove the prostate and I closed the fascia here with  #0-PDS sutures in a running fashion. All  the port sites were irrigated. Marcaine 0.25% with epinephrine  was injected into all the trocar sites. The skin was closed with  4-0 Monocryl in running subcuticular fashion. Skin glue was  applied. At this point patient was awakened and extubated in the operating  room and taken to the recovery room in stable condition. There  were no complications. All counts correct. Estimated blood loss  was 75 cc.

## 2019-06-26 NOTE — INTERVAL H&P NOTE
H&P Update:  Jean Mcintosh was seen and examined. Pt seen and examined the day of surgery and there are no pertinent changes. All questions answered.    Consent signed and on chart  Ancef on call to OR  Urine culture NG  Has been NPO since YOSHI Augustine MD PhD  Endourology & MIS Fellow  [facility]

## 2019-06-26 NOTE — PROGRESS NOTES
conducted an initial consultation and Spiritual Assessment for Landon Lopez, who is a 81 y.o.,male. Patients Primary Language is: [place]. According to the patients EMR Confucianism Affiliation is: Other. The reason the Patient came to the hospital is:   Patient Active Problem List    Diagnosis Date Noted    Elevated PSA     Prostate cancer ([facility] [address]     Epigastric pain 07/29/2015    Melena 07/29/2015    Fatigue 07/21/2014    Hypercholesterolemia 07/21/2014    Urinary frequency 07/21/2014    Decreased libido 07/08/2013    Alopecia 02/21/2010    Benign neoplasm of skin of trunk, except scrotum 02/21/2010    Benign prostatic hyperplasia with urinary frequency 02/21/2010    Herpes simplex type 1 infection 02/21/2010    Benign neoplasm of colon 01/28/2010    Diverticulosis of colon 01/28/2010    Family history of malignant neoplasm of gastrointestinal tract 01/28/2010    Hemorrhoids 01/28/2010    Impotence of organic origin 01/28/2010    Allergic rhinitis 12/18/2007    Asthma 12/18/2007    Herpes infection 12/18/2007    Pneumothorax 12/18/2007        The  provided the following Interventions:  Initiated a relationship of care and support. Provided information about Spiritual Care Services. Offered prayer and assurance of continued prayers on patient's behalf. The following outcomes were achieved:  Patient expressed gratitude for 's visit. Assessment:  There are no further spiritual or Orthodox issues which require intervention at this time. Plan:  Chaplains will continue to follow and will provide pastoral care on an as needed/requested basis. Robert Crane M.Div.   , [facility]: [phone]/[facility]: [phone]

## 2019-06-26 NOTE — PROGRESS NOTES
Urology Progress Note        Assessment/Plan:     Active Problems:    Prostate cancer [facility]) ()        Status Post:  Procedure(s):  DaVinci laparoscopic radical prostatectomy (bilateral nerve Sparing)         Plan:  Doing well and continue with treatment    Subjective:     Daily Progress Note: 2019 4:12 PM    Sameer Chandler is Day of Surgery and doing satisfactory. He reports pain is moderately controlled. He has no complaints. Objective:     Visit Vitals  /72   Pulse 79   Temp 97.7 °F (36.5 °C)   Resp 14   Ht 5' 11\" (1.803 m)   Wt 173 lb (78.5 kg)   SpO2 97%   BMI 24.13 kg/m²        Temp (24hrs), Av.6 °F (36.4 °C), Min:97.3 °F (36.3 °C), Max:97.7 °F (36.5 °C)      Intake and Output:  No intake/output data recorded.    0701 -  1900  In: 2000 [I.V.:2000]  Out: 175 [Urine:100]    Physical Exam:   General appearance: alert, cooperative, no distress, appears stated age  Abdomen: ND, NT    Incision: clean and dry    Data Review:    Recent Results (from the past 24 hour(s))   TYPE & SCREEN    Collection Time: 19  7:00 AM   Result Value Ref Range    Crossmatch Expiration 2019     ABO/Rh(D) Tiajuana Dessert POSITIVE     Antibody screen NEG          Signed By:     Syed Acevedo MD   Urologic Oncologist  [facility]  [facility]  Office [phone] Ext [phone]                          2019

## 2019-06-26 NOTE — H&P
Pre-op H&P     ASSESSMENT:       ICD-10-CM ICD-9-CM     1. Prostate cancer ([facility] [address] C61 185     3. Erectile dysfunction of organic origin N52.9 607.84        1. [id number] [place] 3+4 Adenocarcinoma of the prostate. 3/12 cores involving 5-30% of cores. Biopsy performed on 1/29/2019 for a PSA of 4.24ng/mL. TRUS vol of 17.46 ng/mL              -Scheduled for DVP 6/26/19     2. ED: mild ED, was on cialis 10mg. Now daily cialis 5mg.     3. H/o Spontaneous PTX s/p L thoracotomy 1981     4. Alopecia: On Proscar 5mg     PLAN:    Discussed prostatectomy. Reviewed risks and benefits  Proceed DVP, possible BPLND scheduled for 6/26/19  Plan for nerve spare on right and possible left.     DISCUSSION:   He has had extensive counseling on treatment options and r/b of each. He desires surgery. We again reviewed r/b of surgery including but not limited to impact on QOL-- most prominently on urinary, bowel, and sexual function. We discussed potential for short and long term urinary incontinence and erectile dysfunction. We discussed potential for surgical complications including infection, bleeding, blood transfusion, injury to urinary and surrounding structures including rectum which could require primary repair or diverting colostomy, vascular injury, neuromuscular injury related to positioning, penile shortening, and bladder neck contracture. We discussed the possibility of positive margins and possible need for adjuvant or salvage therapies in the future. We discussed other perioperative risks including DVT, PE, CVA, MI, pneumonia, and death. He is well informed and all questions were answered.        I am following the established plan for Dr. Azra Lind and Dr. Anali Marshall is the supervising physician for this day.            Chief Complaint   Patient presents with    Prostate Cancer      HISTORY OF PRESENT ILLNESS:  Elier Lopez is a 81 y.o. male who presents today for hospital workup prior to prostatectomy 6/3/19. Patient had a biopsy on 1/29/2019 for a PSA of 4.24 ng/mL. Pathology revealed [place] 3+4 Adenocarcinoma of the prostate in 3/12 cores. No family history of prostate cancer. Patient desires not to proceed with any form of radiation.     He reports no significant voiding complaints. Denies hematuria, dysuria, incontinence, and incomplete bladder emptying. Asymptomatic for infection. Has met with PFPT and [facility] health [place]. Denies bone or back pain. Good appetite and stable weight.      Pt is on Proscar 5 mg for hair loss.     Erections: Mild ED. Previously on low dose Cialis 10mg PRN, now on 5mg daily after meeting with Dr. Cesar Walker 5/16/19 for [address] visit. 100% rigidity with cialis. Cannot maintain well w/o cialis.     Very active. Rode 300 miles on bike in [place] 4/2019. Lift weights.      PMH: Spontaneous PTX in 1981  PSH: Thoracotomy in 1981     Review of Systems  Constitutional: Fever: No  Skin: Rash: No  HEENT: Hearing difficulty: No  Eyes: Blurred vision: No  Cardiovascular: Chest pain: No  Respiratory: Shortness of breath: No  Gastrointestinal: Nausea/vomiting: No  Musculoskeletal: Back pain: No  Neurological: Weakness: No  Psychological: Memory loss: No  Comments/additional findings:              Past Medical History:   Diagnosis Date    Asthma      Benign neoplasm of colon      Elevated PSA      Hypercholesteremia      Pneumothorax      Prostate cancer [facility])              Past Surgical History:   Procedure Laterality Date    HX ENDOSCOPY        HX UROLOGICAL   01/29/2019     Pnbx. Trus vol 17.46. Gl 3+4 in 20% L base. Gl 3+3 in L Mid & L Lat Base. Dr. Coley Olszewski.      Social History            Tobacco Use    Smoking status: Never Smoker    Smokeless tobacco: Never Used   Substance Use Topics    Alcohol use: Yes       Comment: rare    Drug use:  No      No Known Allergies           Family History   Problem Relation Age of Onset    Emphysema Mother      Colon Cancer Father       Current Outpatient Medications   Medication Sig Dispense Refill    pantoprazole (PROTONIX) 40 mg tablet Take 40 mg by mouth.        CIALIS 5 mg tablet Take 1 Tab by mouth daily. For ED. Please dispense through compounding pharmacy. 90 Tab 3    budesonide (RHINOCORT AQUA) 32 mcg/actuation nasal spray 1 Spray.        valACYclovir (VALTREX) 1 gram tablet 1 g.        finasteride (PROSCAR) 5 mg tablet take 1 tablet by mouth once daily   0      Any elements of the PMH, FH, SHx, ROS, or preliminary elements of the HPI that were entered by a medical assistant have been reviewed in full.        PHYSICAL EXAMINATION:   Visit Vitals  /72   Ht 5' 11\" (1.803 m)   Wt 180 lb (81.6 kg)   BMI 25.10 kg/m²      Constitutional: WDWN, Pleasant and appropriate affect, No acute distress. CV:  No peripheral swelling noted, RRR, no m/r/g  Respiratory: No respiratory distress or difficulties, CTAB  Chest: symmetric rise, left thoracotomy scar  Abdomen:  NABS, No abdominal tenderness. No CVA tenderness.  Male:    OWEN:deferred  Skin: No jaundice.     Neuro/Psych:  Alert and oriented x 3, affect appropriate.            REVIEW OF LABS AND IMAGING:          Results for orders placed or performed in visit on 05/21/19   CULTURE, URINE   Result Value Ref Range     RESULT Normal     CBC W/O DIFF   Result Value Ref Range     WBC 6.3 4.0 - 11.0 K/uL     RBC 4.12 3.80 - 5.80 M/uL     HGB 12.0 (L) 12.6 - 17.1 g/dL     HCT 36.9 (L) 37.8 - 52.2 %     MCV 90 80 - 95 fL     MCH 29 26 - 34 pg     MCHC 33 31 - 36 g/dL     RDW 13.2 10.0 - 15.5 %     PLATELET 067 761 - 208 K/uL     MPV 9.8 9.0 - 13.0 fL   URINALYSIS W/ RFLX MICROSCOPIC   Result Value Ref Range     Specific Gravity 1.004 (L) 1.005 - 1.03     pH (UA) 7.0 5.0 - 8.0 pH     Protein Negative Negative, mg/dL     Glucose Negative Negative mg/dL     Ketone Negative Negative, mg/dL     Bilirubin Negative Negative     Blood Negative Negative     Nitrites Negative Negative     Leukocyte Esterase Negative Negative     Urobilinogen <2.0 <2.4 mg/dL   METABOLIC PANEL, BASIC   Result Value Ref Range     Glucose 98 70 - 99 mg/dL     BUN 12 6 - 22 mg/dL     Creatinine 1.0 0.8 - 1.6 mg/dL     Sodium 138 133 - 145 mmol/L     Potassium 4.3 3.5 - 5.5 mmol/L     Chloride 102 98 - 110 mmol/L     CO2 27 20 - 32 mmol/L     Calcium 9.0 8.4 - 10.4 mg/dL     Anion gap 9.0 mmol/L     GFRAA >60.0 >60.0     GFRNA >60.0 >60.0   AMB POC URINALYSIS DIP STICK AUTO W/O MICRO   Result Value Ref Range     Color (UA POC) Yellow       Clarity (UA POC) Clear       Glucose (UA POC) Negative Negative     Bilirubin (UA POC) Negative Negative     Ketones (UA POC) Negative Negative     Specific gravity (UA POC) 1.005 1.001 - 1.035     Blood (UA POC) Negative Negative     pH (UA POC) 6.0 4.6 - 8.0     Protein (UA POC) Negative Negative     Urobilinogen (UA POC) 0.2 mg/dL 0.2 - 1     Nitrites (UA POC) Negative Negative     Leukocyte esterase (UA POC) Negative Negative         Biopsy Pathology 1/29/2019:  DIAGNOSIS:   A. Right [place] Needle biopsy                  Benign prostatic tissue. B. Right Mid Needle biopsy                  Benign prostatic tissue. C. Right Base Needle biopsy                  Benign prostatic tissue. D. Right Lateral [place] Needle biopsy                  Benign prostatic tissue. E. Right Lateral Mid Needle biopsy                  HIGH-GRADE PROSTATIC INTRAEPITHELIAL NEOPLASIA. F. Right Lateral Base Needle biopsy                  Benign prostatic tissue. G. Left [place] Needle biopsy                  Benign prostatic tissue. H. Left Mid Needle biopsy                  ADENOCARCINOMA, MARTHA SCORE 3 + 3 = 6 involving 5 % of the specimen (1 of 1 core(s) positive).              Grade Group 1. Tumor involves one end. I. Left Base Needle biopsy                  ADENOCARCINOMA, MARHTA SCORE 3 + 4 = 7 involving 20 % of the specimen (1 of 1 core(s) positive).                 [place] 4 comprises 20 % of the cancer. Grade Group 2. Ends not involved by the tumor. J. Left Lateral [place] Needle biopsy                  Benign prostatic tissue. K. Left Lateral Mid Needle biopsy                  Benign prostatic tissue. L. Left Lateral Base Needle biopsy                  ADENOCARCINOMA, MARTHA SCORE 3 + 3 = 6 involving 30 % of the specimen (1 of 1 core(s) positive).              Grade Group 1.  Ends not involved by the tumor.        BASIL Elise, NP

## 2019-06-26 NOTE — PERIOP NOTES
Pre-Op Summary    Pt arrived via car with family/friend and is oriented to time, place, person and situation. Patient with steady gait with none assistive devices. Visit Vitals  /85 (BP Patient Position: At rest)   Pulse 69   Temp 97.7 °F (36.5 °C)   Resp 16   Ht 5' 11\" (1.803 m)   Wt 78.5 kg (173 lb)   SpO2 100%   BMI 24.13 kg/m²       Peripheral IV located on Left hand . Patients belongings are located with wife. Patient's point of contact is wife, [place]  and their contact number is: [phone]. They will be in the waiting room. They are able to receive medication information. They will be admitted.

## 2019-06-27 VITALS
DIASTOLIC BLOOD PRESSURE: 64 MMHG | BODY MASS INDEX: 24.22 KG/M2 | HEART RATE: 75 BPM | RESPIRATION RATE: 18 BRPM | SYSTOLIC BLOOD PRESSURE: 120 MMHG | WEIGHT: 173 LBS | TEMPERATURE: 97.9 F | OXYGEN SATURATION: 97 % | HEIGHT: 71 IN

## 2019-06-27 LAB
ANION GAP SERPL CALC-SCNC: 8 MMOL/L (ref 3–18)
BUN SERPL-MCNC: 14 MG/DL (ref 7–18)
BUN/CREAT SERPL: 13 (ref 12–20)
CALCIUM SERPL-MCNC: 7.5 MG/DL (ref 8.5–10.1)
CHLORIDE SERPL-SCNC: 104 MMOL/L (ref 100–108)
CO2 SERPL-SCNC: 26 MMOL/L (ref 21–32)
CREAT SERPL-MCNC: 1.11 MG/DL (ref 0.6–1.3)
ERYTHROCYTE [DISTWIDTH] IN BLOOD BY AUTOMATED COUNT: 13 % (ref 11.6–14.5)
GLUCOSE SERPL-MCNC: 99 MG/DL (ref 74–99)
HCT VFR BLD AUTO: 32.2 % (ref 36–48)
HGB BLD-MCNC: 10.3 G/DL (ref 13–16)
MCH RBC QN AUTO: 28.2 PG (ref 24–34)
MCHC RBC AUTO-ENTMCNC: 32 G/DL (ref 31–37)
MCV RBC AUTO: 88.2 FL (ref 74–97)
PLATELET # BLD AUTO: 179 K/UL (ref 135–420)
PMV BLD AUTO: 10.2 FL (ref 9.2–11.8)
POTASSIUM SERPL-SCNC: 4.5 MMOL/L (ref 3.5–5.5)
RBC # BLD AUTO: 3.65 M/UL (ref 4.7–5.5)
SODIUM SERPL-SCNC: 138 MMOL/L (ref 136–145)
WBC # BLD AUTO: 12.8 K/UL (ref 4.6–13.2)

## 2019-06-27 PROCEDURE — 77030012865 HC BG URIN LEG MDII -A

## 2019-06-27 PROCEDURE — 85027 COMPLETE CBC AUTOMATED: CPT

## 2019-06-27 PROCEDURE — 99218 HC RM OBSERVATION: CPT

## 2019-06-27 PROCEDURE — 36415 COLL VENOUS BLD VENIPUNCTURE: CPT

## 2019-06-27 PROCEDURE — 74011250636 HC RX REV CODE- 250/636: Performed by: UROLOGY

## 2019-06-27 PROCEDURE — 74011250637 HC RX REV CODE- 250/637: Performed by: UROLOGY

## 2019-06-27 PROCEDURE — 80048 BASIC METABOLIC PNL TOTAL CA: CPT

## 2019-06-27 PROCEDURE — 77030010545

## 2019-06-27 RX ADMIN — HYDROMORPHONE HYDROCHLORIDE 1 MG: 1 INJECTION, SOLUTION INTRAMUSCULAR; INTRAVENOUS; SUBCUTANEOUS at 06:09

## 2019-06-27 RX ADMIN — Medication 10 ML: at 05:46

## 2019-06-27 RX ADMIN — PANTOPRAZOLE SODIUM 40 MG: 40 TABLET, DELAYED RELEASE ORAL at 08:40

## 2019-06-27 RX ADMIN — CEFAZOLIN 2 G: 10 INJECTION, POWDER, FOR SOLUTION INTRAVENOUS at 08:40

## 2019-06-27 RX ADMIN — SODIUM CHLORIDE, SODIUM LACTATE, POTASSIUM CHLORIDE, AND CALCIUM CHLORIDE 125 ML/HR: 600; 310; 30; 20 INJECTION, SOLUTION INTRAVENOUS at 06:12

## 2019-06-27 RX ADMIN — HEPARIN SODIUM 5000 UNITS: 5000 INJECTION, SOLUTION INTRAVENOUS; SUBCUTANEOUS at 05:46

## 2019-06-27 NOTE — DISCHARGE INSTRUCTIONS
DISCHARGE SUMMARY from Nurse    PATIENT INSTRUCTIONS:    After general anesthesia or intravenous sedation, for 24 hours or while taking prescription Narcotics:  · Limit your activities  · Do not drive and operate hazardous machinery  · Do not make important personal or business decisions  · Do  not drink alcoholic beverages  · If you have not urinated within 8 hours after discharge, please contact your surgeon on call. Report the following to your surgeon:  · Excessive pain, swelling, redness or odor of or around the surgical area  · Temperature over 100.5  · Nausea and vomiting lasting longer than 4 hours or if unable to take medications  · Any signs of decreased circulation or nerve impairment to extremity: change in color, persistent  numbness, tingling, coldness or increase pain  · Any questions    What to do at Home:  Recommended activity: No lifting, Driving, or Strenuous exercise for 6 weeks. *  Please give a list of your current medications to your Primary Care Provider. *  Please update this list whenever your medications are discontinued, doses are      changed, or new medications (including over-the-counter products) are added. *  Please carry medication information at all times in case of emergency situations. These are general instructions for a healthy lifestyle:    No smoking/ No tobacco products/ Avoid exposure to second hand smoke  Surgeon General's Warning:  Quitting smoking now greatly reduces serious risk to your health.     Obesity, smoking, and sedentary lifestyle greatly increases your risk for illness    A healthy diet, regular physical exercise & weight monitoring are important for maintaining a healthy lifestyle    You may be retaining fluid if you have a history of heart failure or if you experience any of the following symptoms:  Weight gain of 3 pounds or more overnight or 5 pounds in a week, increased swelling in our hands or feet or shortness of breath while lying flat in bed. Please call your doctor as soon as you notice any of these symptoms; do not wait until your next office visit. The discharge information has been reviewed with the patient. The patient verbalized understanding. Discharge medications reviewed with the patient and appropriate educational materials and side effects teaching were provided. ___________________________________________________________________________________________________________________________________    Patient armband removed and shredded.

## 2019-06-27 NOTE — PROGRESS NOTES
Bedside and Verbal shift change report given to Mayela Griffith RN (oncoming nurse) by Deshawn Dexter RN (offgoing nurse). Report included the following information SBAR, Kardex, Procedure Summary, Intake/Output and MAR.     1245- Leg bag education given with spouse present, with return demonstration. No further questions. I have reviewed discharge instructions with the patient. The patient verbalized understanding.

## 2019-06-27 NOTE — DISCHARGE SUMMARY
Discharge Summary     Patient ID:  Lucinda Cleaning  [id number]  85 y.o.  1950    Admit Date: 6/26/2019    Discharge Date: 6/27/2019    * Admission Diagnoses: Prostate cancer [facility]) Dionicio Mack    * Discharge Diagnoses:    Hospital Problems as of 6/27/2019 Date Reviewed: 5/21/2019          Codes Class Noted - Resolved POA    Prostate cancer ([facility] [address] ICD-10-CM: C61  ICD-9-CM: 80  Unknown - Present Unknown               Admission Condition: Good    * Discharge Condition: good    * Procedures: Procedure(s):  DaVinci laparoscopic radical prostatectomy (bilateral nerve Sparing)        * Hospital Course:   Normal hospital course for this procedure. Consults: None    Significant Diagnostic Studies: labs:       Labs: Results:   Chemistry    Recent Labs     06/27/19  0355   GLU 99      K 4.5      CO2 26   BUN 14   CREA 1.11   CA 7.5*   AGAP 8   BUCR 13      CBC w/Diff Recent Labs     06/27/19  0355   WBC 12.8   RBC 3.65*   HGB 10.3*   HCT 32.2*         Cultures No results for input(s): CULT in the last 72 hours.   All Micro Results     None            Urinalysis pH (UA)   Date Value Ref Range Status   06/11/2019 7.0 5.0 - 8.0 pH Final     Ketone   Date Value Ref Range Status   06/11/2019 Negative Negative, mg/dL Final     Bilirubin   Date Value Ref Range Status   06/11/2019 Negative Negative Final     Blood   Date Value Ref Range Status   06/11/2019 Negative Negative Final     Nitrites   Date Value Ref Range Status   06/11/2019 Negative Negative Final     Leukocyte Esterase   Date Value Ref Range Status   06/11/2019 Negative Negative Final     Potassium   Date Value Ref Range Status   06/27/2019 4.5 3.5 - 5.5 mmol/L Final     Creatinine   Date Value Ref Range Status   06/27/2019 1.11 0.6 - 1.3 MG/DL Final     BUN   Date Value Ref Range Status   06/27/2019 14 7.0 - 18 MG/DL Final     Prostate Specific Ag   Date Value Ref Range Status   09/21/2018 4.240 (A) 0.00 - 4.10 ng/mL Final      PSA No results for input(s): PSA in the last 72 hours. Coagulation No results found for: PTP, INR, APTT        * Disposition: Home    Discharge Medications:   Current Discharge Medication List      START taking these medications    Details   docusate sodium (COLACE) 100 mg capsule Take 1 Cap by mouth two (2) times a day for 30 days. As needed for constipation. Pls withold for loose stools. Indications: constipation  Qty: 60 Cap, Refills: 0      oxybutynin (DITROPAN) 5 mg tablet Take 1 Tab by mouth three (3) times daily. As needed for bladder spasms. Pls withold 24 hrs prior to catheter removal.  Indications: Frequent Urination, Needing to Urinate Immediately, Take as needed  Qty: 30 Tab, Refills: 0      oxyCODONE-acetaminophen (PERCOCET) 5-325 mg per tablet Take 1 Tab by mouth every six (6) hours as needed for Pain for up to 7 days. Max Daily Amount: 4 Tabs. Qty: 15 Tab, Refills: 0    Associated Diagnoses: Prostate cancer ([facility] [address]         CONTINUE these medications which have NOT CHANGED    Details   pantoprazole (PROTONIX) 40 mg tablet Take 40 mg by mouth two (2) times a day. CIALIS 5 mg tablet Take 1 Tab by mouth daily. For ED. Please dispense through compounding pharmacy. Qty: 90 Tab, Refills: 3    Associated Diagnoses: Erectile dysfunction of organic origin      budesonide (RHINOCORT AQUA) 32 mcg/actuation nasal spray 1 [place] by Both Nostrils route daily. valACYclovir (VALTREX) 1 gram tablet Take 1 g by mouth daily. STOP taking these medications       finasteride (PROSCAR) 5 mg tablet Comments:   Reason for Stopping:               * Follow-up Care/Patient Instructions: Activity: No lifting, Driving, or Strenuous exercise for 6 weeks  Diet: Regular Diet  Wound Care: Keep wound clean and dry  Wound care discussed with patient.      Follow-up Information     Follow up With Specialties Details Why Contact Oly Caldera NP Nurse Practitioner   [address]  [address] [address]  [phone]            PCP:  Roger Ramos NP  Referring Provider: No ref. provider found    Follow up with:Rob Coronel NP in 30 days.   Follow up with: [facility] in 7 days for VT    Signed:  Jeffy Locke MD    [phone]    June 27, 2019 11:08 AM

## 2019-06-27 NOTE — PROGRESS NOTES
Problem: Discharge Planning  Goal: *Discharge to safe environment  Outcome: Progressing Towards Goal   Plan home    Reason for Admission:   Ca prostate                   RRAT Score:       10              Plan for utilizing home health:    no                      Current Advanced Directive/Advance Care Plan: none                         Transition of Care Plan:                    Spoke with pt, lives with spouse. Independent with adls and amb prior to admit. No dme in home. Designates spouse for dcp and transport home. Demographics correct. Follows with NP gato hsieh at [address], saw last yr has appoit in [facility]. Does not want me to notify office of hosp. Plan home, no services anticipated. Cm to follow. Patient has designated ____spouse____________________ to participate in his/her discharge plan and to receive any needed information. Name: Trisha Mckay  Address:  Phone number: C# [phone]    Patient and/or next of kin has been given and has signed the [facility] Outpatient Observation  Notification letter and all questions answered. Copy of this notice given to patient and copy placed on chart. Patient and/or next of kin has been given the Outpatient Observation Information and Notification letter and all questions answered. Care Management Interventions  PCP Verified by CM: Yes  Palliative Care Criteria Met (RRAT>21 & CHF Dx)?: No  Mode of Transport at Discharge:  Other (see comment)  Transition of Care Consult (CM Consult): Discharge Planning  Discharge Durable Medical Equipment: No  Physical Therapy Consult: No  Occupational Therapy Consult: No  Speech Therapy Consult: No  Current Support Network: Lives with Spouse  Confirm Follow Up Transport: Family  Plan discussed with Pt/Family/Caregiver: Yes  Discharge Location  Discharge Placement: Home

## (undated) DEVICE — BAG DRAIN URIN 2000ML LF STRL -- CONVERT TO ITEM 363123

## (undated) DEVICE — BLADELESS OPTICAL TROCAR WITH FIXATION CANNULA: Brand: VERSAONE

## (undated) DEVICE — SUTURE VCRL SZ 4-0 L27IN ABSRB UD L17MM RB-1 1/2 CIR J214H

## (undated) DEVICE — LIGHT HANDLE: Brand: DEVON

## (undated) DEVICE — DRAPE KIT ACCS 4-ARM DISP -- DA VINCI

## (undated) DEVICE — SUTURE ABSRB L6IN L37MM 0 GS-21 GRN 1/2 CIR TAPR PNT NDL VLOCL0306

## (undated) DEVICE — SYR 10ML CTRL LR LCK NSAF LF --

## (undated) DEVICE — APPLICATOR BNDG 1MM ADH PREMIERPRO EXOFIN

## (undated) DEVICE — Device

## (undated) DEVICE — ELECTRO LUBE IS A SINGLE PATIENT USE DEVICE THAT IS INTENDED TO BE USED ON ELECTROSURGICAL ELECTRODES TO REDUCE STICKING.: Brand: KEY SURGICAL ELECTRO LUBE

## (undated) DEVICE — TIP COVER ACCESSORY

## (undated) DEVICE — SOLUTION IV STRL H2O 500 ML AQUALITE POUR BTL

## (undated) DEVICE — TRAY PREP DRY W/ PREM GLV 2 APPL 6 SPNG 2 UNDPD 1 OVERWRAP

## (undated) DEVICE — SUTURE MCRYL SZ 4-0 L18IN ABSRB UD L19MM PS-2 3/8 CIR PRIM Y496G

## (undated) DEVICE — INSTRMT SET WND CLSR SUT PASS --

## (undated) DEVICE — KENDALL SCD EXPRESS SLEEVES, KNEE LENGTH, MEDIUM: Brand: KENDALL SCD

## (undated) DEVICE — SUTURE PDS II SZ 0 L27IN ABSRB VLT L36MM CT-1 1/2 CIR Z340H

## (undated) DEVICE — STERILE POLYISOPRENE POWDER-FREE SURGICAL GLOVES: Brand: PROTEXIS

## (undated) DEVICE — (D)PREP SKN CHLRAPRP APPL 26ML -- CONVERT TO ITEM 371833

## (undated) DEVICE — DRAPE,UTILITY,TAPE,15X26,STERILE: Brand: MEDLINE

## (undated) DEVICE — SPECIMEN RETRIEVAL POUCH: Brand: ENDO CATCH GOLD

## (undated) DEVICE — SHEET,DRAPE,70X100,STERILE: Brand: MEDLINE

## (undated) DEVICE — SUTURE V-LOC 90 3-0 L6IN ABSRB UD CV-23 L17MM 1/2 CIR VLOCM1904

## (undated) DEVICE — CLIP INT XL YEL POLYMER HEM-O-LOK WECK

## (undated) DEVICE — LAPAROSCOPIC SCISSORS: Brand: EPIX LAPAROSCOPIC SCISSORS

## (undated) DEVICE — CARTRIDGE CLP LIG HEMLOK GRN --

## (undated) DEVICE — DEVICE SECUREMENT AD W/ TRICOT ANCHR PD FOR F LTX SIL CATH

## (undated) DEVICE — CATHETER F BLLN 5CC 18FR 2 W HYDRGEL COAT LESS TRAUM LUB

## (undated) DEVICE — CLIP LIG ABSRB HEM-LOK LG PUR --

## (undated) DEVICE — INTENDED FOR TISSUE SEPARATION, AND OTHER PROCEDURES THAT REQUIRE A SHARP SURGICAL BLADE TO PUNCTURE OR CUT.: Brand: BARD-PARKER ® CARBON RIB-BACK BLADES

## (undated) DEVICE — SUTURE V-LOC 180 SZ 3-0 L6IN ABSRB VLT CV-23 L17MM 1/2 CIR VLOCM0804

## (undated) DEVICE — TELFA NON-ADHERENT ABSORBENT DRESSING: Brand: TELFA

## (undated) DEVICE — SYR IRR CATH TIP LR ADPT 70ML -- CONVERT TO ITEM 363120

## (undated) DEVICE — Z DISCONTINUED USE 2639304 STRAP POS W3INXL30FT WIDE BK TO BK HK AND LOOP CLSR ALISTRP

## (undated) DEVICE — BLADE ASSEMB CLP HAIR FINE --

## (undated) DEVICE — NEEDLE HYPO 25GA L1.5IN BLU POLYPR HUB S STL REG BVL STR

## (undated) DEVICE — NEEDLE HYPO 25GA L1.5IN BVL ORIENTED ECLIPSE

## (undated) DEVICE — SUTURE MCRYL SZ 3-0 L27IN ABSRB UD L17MM RB-1 1/2 CIR Y215H

## (undated) DEVICE — VISUALIZATION SYSTEM: Brand: CLEARIFY

## (undated) DEVICE — CATH NEPHSTMY 4 WNG MCOT 24FR -- LTX

## (undated) DEVICE — REM POLYHESIVE ADULT PATIENT RETURN ELECTRODE: Brand: VALLEYLAB

## (undated) DEVICE — 2, DISPOSABLE SUCTION/IRRIGATOR WITH DISPOSABLE TIP: Brand: STRYKEFLOW

## (undated) DEVICE — STAPLER SKIN H3.9MM WIRE DIA0.58MM CRWN 6.9MM 35 STPL FIX

## (undated) DEVICE — 1010 S-DRAPE TOWEL DRAPE 10/BX: Brand: STERI-DRAPE™

## (undated) DEVICE — APPLIER CLP M/L SHFT DIA5MM 15 LIG LIGAMAX 5

## (undated) DEVICE — 40580 - THE PINK PAD - ADVANCED TRENDELENBURG POSITIONING KIT: Brand: 40580 - THE PINK PAD - ADVANCED TRENDELENBURG POSITIONING KIT

## (undated) DEVICE — OBTRTR BLDELSS 8MM DISP -- DA VINCI - SNGL USE

## (undated) DEVICE — BLADELESS OPTICAL TROCAR WITH FIXATION CANNULA: Brand: VERSAPORT

## (undated) DEVICE — 40418 TRENDELENBURG ONE-STEP ARM PROTECTORS LARGE (1 PAIR): Brand: 40418 TRENDELENBURG ONE-STEP ARM PROTECTORS LARGE (1 PAIR)